# Patient Record
Sex: FEMALE | Race: WHITE | NOT HISPANIC OR LATINO | Employment: OTHER | URBAN - METROPOLITAN AREA
[De-identification: names, ages, dates, MRNs, and addresses within clinical notes are randomized per-mention and may not be internally consistent; named-entity substitution may affect disease eponyms.]

---

## 2024-01-31 ENCOUNTER — OFFICE VISIT (OUTPATIENT)
Dept: URGENT CARE | Facility: CLINIC | Age: 24
End: 2024-01-31
Payer: COMMERCIAL

## 2024-01-31 VITALS
RESPIRATION RATE: 18 BRPM | TEMPERATURE: 98.3 F | OXYGEN SATURATION: 100 % | BODY MASS INDEX: 45.36 KG/M2 | WEIGHT: 256 LBS | HEIGHT: 63 IN | HEART RATE: 94 BPM | SYSTOLIC BLOOD PRESSURE: 119 MMHG | DIASTOLIC BLOOD PRESSURE: 85 MMHG

## 2024-01-31 DIAGNOSIS — J02.9 ACUTE PHARYNGITIS, UNSPECIFIED ETIOLOGY: Primary | ICD-10-CM

## 2024-01-31 LAB — POC RAPID STREP: NEGATIVE

## 2024-01-31 PROCEDURE — 99213 OFFICE O/P EST LOW 20 MIN: CPT | Mod: 25

## 2024-01-31 PROCEDURE — 87880 STREP A ASSAY W/OPTIC: CPT

## 2024-01-31 RX ORDER — AMOXICILLIN 500 MG/1
500 CAPSULE ORAL 2 TIMES DAILY
Qty: 20 CAPSULE | Refills: 0 | Status: SHIPPED | OUTPATIENT
Start: 2024-01-31 | End: 2024-02-10

## 2024-01-31 RX ORDER — FAMOTIDINE 40 MG/1
TABLET, FILM COATED ORAL
COMMUNITY
Start: 2024-01-02

## 2024-01-31 RX ORDER — PANTOPRAZOLE SODIUM 40 MG/1
40 TABLET, DELAYED RELEASE ORAL
COMMUNITY
Start: 2024-01-02

## 2024-01-31 RX ORDER — ALBUTEROL SULFATE 90 UG/1
AEROSOL, METERED RESPIRATORY (INHALATION)
COMMUNITY
Start: 2018-09-13

## 2024-01-31 RX ORDER — ZOLPIDEM TARTRATE 5 MG/1
5 TABLET ORAL DAILY PRN
COMMUNITY
Start: 2024-01-04 | End: 2024-04-03

## 2024-01-31 RX ORDER — VENLAFAXINE HYDROCHLORIDE 150 MG/1
150 CAPSULE, EXTENDED RELEASE ORAL
COMMUNITY
Start: 2023-04-21

## 2024-01-31 RX ORDER — POLYETHYLENE GLYCOL 3350 17 G/17G
17 POWDER, FOR SOLUTION ORAL
COMMUNITY
Start: 2024-01-30

## 2024-01-31 RX ORDER — ROPINIROLE 0.25 MG/1
TABLET, FILM COATED ORAL
COMMUNITY
Start: 2024-01-04

## 2024-01-31 RX ORDER — OXCARBAZEPINE 150 MG/1
150 TABLET, FILM COATED ORAL 2 TIMES DAILY
COMMUNITY
Start: 2024-01-30

## 2024-01-31 NOTE — PROGRESS NOTES
[facility] URGENT CARE RIRI NOTE:      Name: Gloria Ratliff, 23 y.o.    CSN:[id number]   MRN:[id number]    PCP: Arlet Uriarte, APRN-CNP    ALL:    Allergies   Allergen Reactions    Folic Acid Anaphylaxis, Hives, Itching, Rash and Swelling    Methotrexate Anaphylaxis, Hives, Itching, Rash and Swelling    Peanut Anaphylaxis     Only flared up during pregnancy    Adhesive Tape-Silicones Rash       History:    Chief Complaint: Sore Throat (Sore throat, left ear pain x 4 days )    Encounter Date: 1/31/2024      HPI: The history was obtained from the patient. Gloria is a 23 y.o. female, who presents with a chief complaint of Sore Throat (Sore throat, left ear pain x 4 days ). She states she is prone to strep throat and this is what it feels like. Also states her son had RSV last week. She endorses a cough as well although sttes the cough is more clearing her throat rather than irritant cough. Has nasal congestion as well. L ear feels full with no tenderness. She has taken otc medication with no relief. She denies fevers, headaches, N/V, chest pain, sob, abdominal pain.     PMHx:    Past Medical History:   Diagnosis Date    Encounter for gynecological examination (general) (routine) without abnormal findings 10/22/2019    Pap test, as part of routine gynecological examination    Other conditions influencing health status     History of vaginal delivery    Other conditions influencing health status     Menarche              Current Outpatient Medications   Medication Sig Dispense Refill    albuterol 90 mcg/actuation inhaler Inhale.      famotidine (Pepcid) 40 mg tablet TAKE 1 TABLET BY MOUTH EVERY DAY IN THE EVENING AT 6PM      OXcarbazepine (Trileptal) 150 mg tablet Take 1 tablet (150 mg) by mouth twice a day.      pantoprazole (ProtoNix) 40 mg EC tablet Take 1 tablet (40 mg) by mouth once daily.      polyethylene glycol (Glycolax, Miralax) 17 gram/dose powder Take 17 g by mouth once daily.       rimegepant (Nurtec ODT) 75 mg tablet,disintegrating Take 1 tablet (75 mg) by mouth.      rOPINIRole (Requip) 0.25 mg tablet Take 1 tablet by mouth 2 hours prior to bedtime      venlafaxine XR (Effexor-XR) 150 mg 24 hr capsule Take 1 capsule (150 mg) by mouth once daily.      zolpidem (Ambien) 5 mg tablet Take 1 tablet (5 mg) by mouth once daily as needed.      amoxicillin (Amoxil) 500 mg capsule Take 1 capsule (500 mg) by mouth 2 times a day for 10 days. 20 capsule 0     No current facility-administered medications for this visit.         PMSx:    Past Surgical History:   Procedure Laterality Date    OTHER SURGICAL HISTORY  10/22/2019    Pine tooth extraction       Fam Hx: No family history on file.    SOC. Hx:     Social History     Socioeconomic History    Marital status:      Spouse name: Not on file    Number of children: Not on file    Years of education: Not on file    Highest education level: Not on file   Occupational History    Not on file   Tobacco Use    Smoking status: Never    Smokeless tobacco: Never   Substance and Sexual Activity    Alcohol use: Not on file    Drug use: Not on file    Sexual activity: Not on file   Other Topics Concern    Not on file   Social History Narrative    Not on file     Social Determinants of Health     Financial Resource Strain: Not on file   Food Insecurity: Not on file   Transportation Needs: Not on file   Physical Activity: Not on file   Stress: Not on file   Social Connections: Not on file   Intimate Partner Violence: Not on file   Housing Stability: Not on file         Vitals:    01/31/24 1126   BP: 119/85   Pulse: 94   Resp: 18   Temp: 36.8 °C (98.3 °F)   SpO2: 100%     116 kg (256 lb)          Physical Exam  Constitutional:       Appearance: Normal appearance.   HENT:      Right Ear: A middle ear effusion is present.      Left Ear: A middle ear effusion is present.      Nose: Congestion present.      Mouth/Throat:      Mouth: Mucous membranes are moist.       Pharynx: Oropharynx is clear. Posterior oropharyngeal erythema present.      Tonsils: No tonsillar exudate.   Eyes:      Conjunctiva/sclera: Conjunctivae normal.      Pupils: Pupils are equal, round, and reactive to light.   Cardiovascular:      Rate and Rhythm: Normal rate and regular rhythm.   Pulmonary:      Effort: Pulmonary effort is normal.      Breath sounds: Normal breath sounds.   Skin:     General: Skin is warm.   Neurological:      General: No focal deficit present.      Mental Status: She is alert and oriented to person, place, and time.   Psychiatric:         Mood and Affect: Mood normal.         Behavior: Behavior normal.       LABORATORY @ RADIOLOGICAL IMAGING (if done):     Results for orders placed or performed in visit on 01/31/24 (from the past 24 hour(s))   POCT rapid strep A manually resulted   Result Value Ref Range    POC Rapid Strep Negative Negative        COURSE/MEDICAL DECISION MAKING:    Gloria is a 23 y.o., who presents with a working diagnosis of   1. Acute pharyngitis, unspecified etiology      Gloria was seen today for sore throat.  Diagnoses and all orders for this visit:  Acute pharyngitis, unspecified etiology (Primary)  -     POCT rapid strep A manually resulted  -     amoxicillin (Amoxil) 500 mg capsule; Take 1 capsule (500 mg) by mouth 2 times a day for 10 days.  Likely an acute pharyngitis. Patient requests an antibiotic. Will send in amoxicillin to cover for possible bacterial causes. Continue otc medications. Push fluids.     As we discussed, she is to return to our office or ER immediately if there is any worsening of her condition, such as increased cough, shortness of breath, persistent fevers, repeated vomiting, dehydration, or if her condition worsens at all.    Jordana Jackson PA-C   Advanced Practice Provider  [facility] URGENT CARE